# Patient Record
Sex: MALE | Race: BLACK OR AFRICAN AMERICAN | Employment: UNEMPLOYED | RURAL
[De-identification: names, ages, dates, MRNs, and addresses within clinical notes are randomized per-mention and may not be internally consistent; named-entity substitution may affect disease eponyms.]

---

## 2020-09-16 ENCOUNTER — VIRTUAL VISIT (OUTPATIENT)
Dept: FAMILY MEDICINE CLINIC | Age: 25
End: 2020-09-16

## 2020-09-16 ENCOUNTER — NURSE TRIAGE (OUTPATIENT)
Dept: OTHER | Facility: CLINIC | Age: 25
End: 2020-09-16

## 2020-09-16 DIAGNOSIS — R43.0 LOSS OF SMELL: ICD-10-CM

## 2020-09-16 DIAGNOSIS — R06.02 SHORTNESS OF BREATH: Primary | ICD-10-CM

## 2020-09-16 PROCEDURE — 99202 OFFICE O/P NEW SF 15 MIN: CPT | Performed by: NURSE PRACTITIONER

## 2020-09-16 NOTE — TELEPHONE ENCOUNTER
CALL RECEIVED FROM:   PATIENT STATES:   Patient states symptoms started approximately 1 week ago. Chills, joint pain, diarrhea, weakness, bruising, shortness of breath    Had been diagnosed positive in June, 2020 had no symptoms at this time. Had a negative test July 2, 2020. Free of symptoms until 1 week ago. States he thinks the diarrhea, abdominal pain, and weakness is the worst at this time. No chronic medical problems. Breathing is okay until he over exerts. New home address is [address] [address], [place] [address]      PROTOCOL RECOMMENDATION: Discuss with PCP. Care advice given. Advice understood. TRANSFER TO: Annette, [phone]. Explained to her that patient does not have a pcp and that he needs to be set up with one and he needs to be seen or in contact with them today. PLEASE DO NOT RESPOND TO THIS TRIAGE NOTE THROUGH THIS ENCOUNTER. ANY SUBSEQUENT COMMUNICATION SHOULD BE DIRECTLY WITH THE PATIENT.      Reason for Disposition   [1] COVID-19 infection suspected by caller or triager AND [2] mild symptoms (cough, fever, or others) AND [2] no complications or SOB    Protocols used: CORONAVIRUS (COVID-19) DIAGNOSED OR SUSPECTED-ADULT-[place]

## 2020-09-16 NOTE — LETTER
NOTIFICATION RETURN TO WORK / SCHOOL 
 
9/16/2020 2:18 PM 
 
Mr. Kody Bobo [address] [employer] [address] [facility] [address] To Whom It May Concern: Kody Bobo is currently under the care of [address] [place] [address]. He was seen for a virtual sick visit on 9/16. If there are questions or concerns please have the patient contact our office. Sincerely, Madi Akers NP

## 2020-09-16 NOTE — PROGRESS NOTES
HISTORY OF PRESENT ILLNESS  Carlo Bonds is a 23 y.o. male. HPI   Pt presents with \"possible COVID\"  Visit was conducted via [employer]. me  Pt was located in the car, provider was located at [facility]  Visit lasted 5 minutes  Carlo Bonds, who was evaluated through a synchronous (real-time) audio-video encounter, and/or his healthcare decision maker, is aware that it is a billable service, with coverage as determined by his insurance carrier. He provided verbal consent to proceed: Yes, and patient identification was verified. It was conducted pursuant to the emergency declaration under the [facility], [facility] authority and the [employer] Act. A caregiver was present when appropriate. Ability to conduct physical exam was limited. I was in the office. The patient was at home. Pt states that he is concerned that he may have corona virus. He states that he was already tested positive for COVID in June, but he was asymptomatic at the time. This was while he was incarcerated. He states that he has had symptoms for about a week and a half  Shortness of breath  Joint pain  Diarrhea  Loss of smell  He is unsure if he has had a fever or not, as he has not checked his temperature. He states that he had a meeting today that he was supposed to go to, but being that he was worried about COVID he missed this. He is requesting letter from doctors appointment to be faxed to excuse this absence. Review of Systems   Constitutional: Negative for fever. Respiratory: Positive for shortness of breath. Gastrointestinal: Positive for diarrhea. Physical Exam  Constitutional:       Appearance: Normal appearance. Pulmonary:      Effort: Pulmonary effort is normal.   Neurological:      Mental Status: He is alert.    Psychiatric:         Mood and Affect: Mood normal.         ASSESSMENT and PLAN  Educated about going to [facility] at [facility] for testing at this time. Letter faxed stating that he did have an appointment today. Pt informed to return to office with worsening of symptoms, or PRN with any questions or concerns. Pt verbalizes understanding of plan of care and denies further questions or concerns at this time.

## 2021-02-02 ENCOUNTER — APPOINTMENT (OUTPATIENT)
Dept: GENERAL RADIOLOGY | Age: 26
End: 2021-02-02
Attending: EMERGENCY MEDICINE
Payer: MEDICAID

## 2021-02-02 ENCOUNTER — HOSPITAL ENCOUNTER (EMERGENCY)
Age: 26
Discharge: HOME | End: 2021-02-02
Attending: EMERGENCY MEDICINE | Admitting: EMERGENCY MEDICINE
Payer: MEDICAID

## 2021-02-02 VITALS
WEIGHT: 240 LBS | HEART RATE: 78 BPM | RESPIRATION RATE: 16 BRPM | DIASTOLIC BLOOD PRESSURE: 77 MMHG | BODY MASS INDEX: 34.36 KG/M2 | SYSTOLIC BLOOD PRESSURE: 144 MMHG | HEIGHT: 70 IN | OXYGEN SATURATION: 98 % | TEMPERATURE: 98.7 F

## 2021-02-02 DIAGNOSIS — S39.012A LUMBAR STRAIN, INITIAL ENCOUNTER: ICD-10-CM
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
## 2021-02-02 PROCEDURE — 99282 EMERGENCY DEPT VISIT SF MDM: CPT

## 2021-02-02 PROCEDURE — 72100 X-RAY EXAM L-S SPINE 2/3 VWS: CPT

## 2021-02-02 RX ORDER — NAPROXEN 500 MG/1
500 TABLET ORAL 2 TIMES DAILY WITH MEALS
Qty: 20 TAB | Refills: 0 | Status: SHIPPED | OUTPATIENT
Start: 2021-02-02 | End: 2021-02-12

## 2021-02-02 RX ORDER — CYCLOBENZAPRINE HCL 10 MG
10 TABLET ORAL
Qty: 20 TAB | Refills: 0 | Status: SHIPPED | OUTPATIENT
Start: 2021-02-02

## 2021-02-02 RX ORDER — LIDOCAINE 50 MG/G
PATCH TOPICAL
Qty: 5 EACH | Refills: 0 | Status: SHIPPED | OUTPATIENT
Start: 2021-02-02 | End: 2021-02-11

## 2021-02-02 NOTE — Clinical Note
[facility] EMERGENCY DEPT 
[address] [facility] [address] 
[phone] Work/School Note Date: 2/2/2021 To Whom It May concern: Srinath Shipley was seen and treated today in the emergency room by the following provider(s): 
Attending Provider: Torri Waller MD. Srinath Shipley is excused from work/school on 2/2/2021 through 2/5/2021. He is medically clear to return to work/school on 2/6/2021.   
  
 
Sincerely, 
 
 
 
 
Trinidad Borges MD
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
## 2021-02-02 NOTE — Clinical Note
[facility] EMERGENCY DEPT 
[address] [facility] [address] 
[phone] Work/School Note Date: 2/2/2021 To Whom It May concern: Srinath Shipley was seen and treated today in the emergency room by the following provider(s): 
Attending Provider: Torri Waller MD. Srinath Shipley is excused from work/school on 02/02/21. He is clear to return to work/school on 02/03/21.    
 
 
Sincerely, 
 
 
 
 
Trinidad Borges MD

## 2021-02-02 NOTE — ED NOTES
The patient was discharged home by provider in stable condition. The patient is alert and oriented, in no respiratory distress. The patient's diagnosis, condition and treatment were explained. The patient expressed understanding. Three prescriptions given. Work note given. A discharge plan has been developed. A  was not involved in the process. Aftercare instructions were given. Pt ambulatory out of the ED.

## 2021-02-11 ENCOUNTER — VIRTUAL VISIT (OUTPATIENT)
Dept: FAMILY MEDICINE CLINIC | Age: 26
End: 2021-02-11

## 2021-02-11 DIAGNOSIS — M25.562 ACUTE PAIN OF LEFT KNEE: ICD-10-CM

## 2021-02-11 DIAGNOSIS — M79.605 PAIN OF LEFT LOWER EXTREMITY: ICD-10-CM
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
## 2021-02-11 DIAGNOSIS — M54.50 ACUTE MIDLINE LOW BACK PAIN, UNSPECIFIED WHETHER SCIATICA PRESENT: Primary | ICD-10-CM

## 2021-02-11 PROCEDURE — 99212 OFFICE O/P EST SF 10 MIN: CPT | Performed by: FAMILY MEDICINE
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
## 2023-05-10 RX ORDER — CYCLOBENZAPRINE HCL 10 MG
TABLET ORAL 3 TIMES DAILY PRN
COMMUNITY
Start: 2021-02-02